# Patient Record
Sex: MALE | Race: WHITE
[De-identification: names, ages, dates, MRNs, and addresses within clinical notes are randomized per-mention and may not be internally consistent; named-entity substitution may affect disease eponyms.]

---

## 2017-11-20 ENCOUNTER — HOSPITAL ENCOUNTER (OUTPATIENT)
Dept: HOSPITAL 76 - LAB.F | Age: 67
Discharge: HOME | End: 2017-11-20
Attending: PHYSICIAN ASSISTANT
Payer: MEDICARE

## 2017-11-20 DIAGNOSIS — R00.0: ICD-10-CM

## 2017-11-20 DIAGNOSIS — D64.9: ICD-10-CM

## 2017-11-20 DIAGNOSIS — R53.83: Primary | ICD-10-CM

## 2017-11-20 DIAGNOSIS — E11.9: ICD-10-CM

## 2017-11-20 LAB
ALBUMIN DIAFP-MCNC: 4.5 G/DL (ref 3.2–5.5)
ALBUMIN/GLOB SERPL: 1.6 {RATIO} (ref 1–2.2)
ALP SERPL-CCNC: 43 IU/L (ref 42–121)
ALT SERPL W P-5'-P-CCNC: 29 IU/L (ref 10–60)
ANION GAP SERPL CALCULATED.4IONS-SCNC: 7 MMOL/L (ref 6–13)
AST SERPL W P-5'-P-CCNC: 26 IU/L (ref 10–42)
BASOPHILS NFR BLD AUTO: 0 10^3/UL (ref 0–0.1)
BASOPHILS NFR BLD AUTO: 0.6 %
BILIRUB BLD-MCNC: 0.7 MG/DL (ref 0.2–1)
BUN SERPL-MCNC: 22 MG/DL (ref 6–20)
CALCIUM UR-MCNC: 9.6 MG/DL (ref 8.5–10.3)
CHLORIDE SERPL-SCNC: 102 MMOL/L (ref 101–111)
CO2 SERPL-SCNC: 26 MMOL/L (ref 21–32)
CREAT SERPLBLD-SCNC: 0.9 MG/DL (ref 0.6–1.2)
EOSINOPHIL # BLD AUTO: 0.1 10^3/UL (ref 0–0.7)
EOSINOPHIL NFR BLD AUTO: 2.1 %
ERYTHROCYTE [DISTWIDTH] IN BLOOD BY AUTOMATED COUNT: 12.2 % (ref 12–15)
EST. AVERAGE GLUCOSE BLD GHB EST-MCNC: 108 MG/DL (ref 70–100)
FOLATE SERPL-MCNC: > 49.6 NG/ML
GFRSERPLBLD MDRD-ARVRAT: 84 ML/MIN/{1.73_M2} (ref 89–?)
GLOBULIN SER-MCNC: 2.9 G/DL (ref 2.1–4.2)
GLUCOSE SERPL-MCNC: 87 MG/DL (ref 70–100)
HB2 TOTAL: 14.1 G/DL
HBA1C BLD-MCNC: 0.5 G/DL
HEMOGLOBIN A1C %: 5.4 % (ref 4.6–6.2)
HGB UR QL STRIP: 13.5 G/DL (ref 14–18)
LYMPHOCYTES # SPEC AUTO: 1 10^3/UL (ref 1.5–3.5)
LYMPHOCYTES NFR BLD AUTO: 22.7 %
MCH RBC QN AUTO: 33.5 PG (ref 27–31)
MCHC RBC AUTO-ENTMCNC: 33.8 G/DL (ref 32–36)
MCV RBC AUTO: 99.1 FL (ref 80–94)
MONOCYTES # BLD AUTO: 0.4 10^3/UL (ref 0–1)
MONOCYTES NFR BLD AUTO: 9.3 %
NEUTROPHILS # BLD AUTO: 2.9 10^3/UL (ref 1.5–6.6)
NEUTROPHILS # SNV AUTO: 4.4 X10^3/UL (ref 4.8–10.8)
NEUTROPHILS NFR BLD AUTO: 65.3 %
PDW BLD AUTO: 7.5 FL (ref 7.4–11.4)
PLATELET # BLD: 240 10^3/UL (ref 130–450)
PROT SPEC-MCNC: 7.4 G/DL (ref 6.7–8.2)
RBC MAR: 4.02 10^6/UL (ref 4.7–6.1)
SODIUM SERPLBLD-SCNC: 135 MMOL/L (ref 135–145)
TSH SERPL-ACNC: 1.81 UIU/ML (ref 0.34–5.6)
VIT B12 SERPL-MCNC: 387 PG/ML (ref 180–914)

## 2017-11-20 PROCEDURE — 81599 UNLISTED MAAA: CPT

## 2017-11-20 PROCEDURE — 84443 ASSAY THYROID STIM HORMONE: CPT

## 2017-11-20 PROCEDURE — 82746 ASSAY OF FOLIC ACID SERUM: CPT

## 2017-11-20 PROCEDURE — 85025 COMPLETE CBC W/AUTO DIFF WBC: CPT

## 2017-11-20 PROCEDURE — 80053 COMPREHEN METABOLIC PANEL: CPT

## 2017-11-20 PROCEDURE — 85018 HEMOGLOBIN: CPT

## 2017-11-20 PROCEDURE — 83021 HEMOGLOBIN CHROMOTOGRAPHY: CPT

## 2017-11-20 PROCEDURE — 85014 HEMATOCRIT: CPT

## 2017-11-20 PROCEDURE — 82607 VITAMIN B-12: CPT

## 2017-11-20 PROCEDURE — 36415 COLL VENOUS BLD VENIPUNCTURE: CPT

## 2017-11-20 PROCEDURE — 83036 HEMOGLOBIN GLYCOSYLATED A1C: CPT

## 2018-02-09 ENCOUNTER — HOSPITAL ENCOUNTER (OUTPATIENT)
Dept: HOSPITAL 76 - LAB.F | Age: 68
Discharge: HOME | End: 2018-02-09
Attending: PHYSICIAN ASSISTANT
Payer: MEDICARE

## 2018-02-09 DIAGNOSIS — E78.5: Primary | ICD-10-CM

## 2018-02-09 LAB
CHOLEST SERPL-MCNC: 130 MG/DL
HDLC SERPL-MCNC: 46 MG/DL
HDLC SERPL: 2.8 {RATIO} (ref ?–5)
LDLC SERPL CALC-MCNC: 66 MG/DL
LDLC/HDLC SERPL: 1.4 {RATIO} (ref ?–3.6)
VLDLC SERPL-SCNC: 18 MG/DL

## 2018-02-09 PROCEDURE — 83721 ASSAY OF BLOOD LIPOPROTEIN: CPT

## 2018-02-09 PROCEDURE — 36415 COLL VENOUS BLD VENIPUNCTURE: CPT

## 2018-02-09 PROCEDURE — 80061 LIPID PANEL: CPT

## 2018-12-04 ENCOUNTER — HOSPITAL ENCOUNTER (OUTPATIENT)
Dept: HOSPITAL 76 - LAB.F | Age: 68
Discharge: HOME | End: 2018-12-04
Attending: INTERNAL MEDICINE
Payer: MEDICARE

## 2018-12-04 DIAGNOSIS — E11.9: Primary | ICD-10-CM

## 2018-12-04 LAB
ANION GAP SERPL CALCULATED.4IONS-SCNC: 7 MMOL/L (ref 6–13)
BUN SERPL-MCNC: 19 MG/DL (ref 6–20)
CALCIUM UR-MCNC: 9.5 MG/DL (ref 8.5–10.3)
CHLORIDE SERPL-SCNC: 102 MMOL/L (ref 101–111)
CHOLEST SERPL-MCNC: 147 MG/DL
CO2 SERPL-SCNC: 28 MMOL/L (ref 21–32)
CREAT SERPLBLD-SCNC: 0.8 MG/DL (ref 0.6–1.2)
EST. AVERAGE GLUCOSE BLD GHB EST-MCNC: 103 MG/DL (ref 70–100)
GFRSERPLBLD MDRD-ARVRAT: 96 ML/MIN/{1.73_M2} (ref 89–?)
GLUCOSE SERPL-MCNC: 101 MG/DL (ref 70–100)
HB2 TOTAL: 14.6 G/DL
HBA1C BLD-MCNC: 0.49 G/DL
HDLC SERPL-MCNC: 51 MG/DL
HDLC SERPL: 2.9 {RATIO} (ref ?–5)
HEMOGLOBIN A1C %: 5.2 % (ref 4.6–6.2)
LDLC SERPL CALC-MCNC: 70 MG/DL
LDLC/HDLC SERPL: 1.4 {RATIO} (ref ?–3.6)
SODIUM SERPLBLD-SCNC: 137 MMOL/L (ref 135–145)
VLDLC SERPL-SCNC: 26 MG/DL

## 2018-12-04 PROCEDURE — 83721 ASSAY OF BLOOD LIPOPROTEIN: CPT

## 2018-12-04 PROCEDURE — 80061 LIPID PANEL: CPT

## 2018-12-04 PROCEDURE — 82043 UR ALBUMIN QUANTITATIVE: CPT

## 2018-12-04 PROCEDURE — 83036 HEMOGLOBIN GLYCOSYLATED A1C: CPT

## 2018-12-04 PROCEDURE — 36415 COLL VENOUS BLD VENIPUNCTURE: CPT

## 2018-12-04 PROCEDURE — 80048 BASIC METABOLIC PNL TOTAL CA: CPT

## 2019-07-18 ENCOUNTER — HOSPITAL ENCOUNTER (OUTPATIENT)
Dept: HOSPITAL 76 - LAB.S | Age: 69
Discharge: HOME | End: 2019-07-18
Attending: INTERNAL MEDICINE
Payer: MEDICARE

## 2019-07-18 DIAGNOSIS — E78.5: Primary | ICD-10-CM

## 2019-07-18 DIAGNOSIS — E11.9: ICD-10-CM

## 2019-07-18 LAB
EST. AVERAGE GLUCOSE BLD GHB EST-MCNC: 114 MG/DL (ref 70–100)
HB2 TOTAL: 13.8 G/DL
HBA1C BLD-MCNC: 0.52 G/DL
HEMOGLOBIN A1C %: 5.6 % (ref 4.6–6.2)

## 2019-07-18 PROCEDURE — 80053 COMPREHEN METABOLIC PANEL: CPT

## 2019-07-18 PROCEDURE — 80061 LIPID PANEL: CPT

## 2019-07-18 PROCEDURE — 36415 COLL VENOUS BLD VENIPUNCTURE: CPT

## 2019-07-18 PROCEDURE — 83721 ASSAY OF BLOOD LIPOPROTEIN: CPT

## 2019-07-18 PROCEDURE — 83036 HEMOGLOBIN GLYCOSYLATED A1C: CPT

## 2020-01-02 ENCOUNTER — HOSPITAL ENCOUNTER (OUTPATIENT)
Dept: HOSPITAL 76 - LAB.S | Age: 70
Discharge: HOME | End: 2020-01-02
Attending: INTERNAL MEDICINE
Payer: MEDICARE

## 2020-01-02 DIAGNOSIS — E78.5: Primary | ICD-10-CM

## 2020-01-02 DIAGNOSIS — E11.9: ICD-10-CM

## 2020-01-02 LAB
ALBUMIN DIAFP-MCNC: 4.4 G/DL (ref 3.2–5.5)
ALBUMIN/GLOB SERPL: 1.6 {RATIO} (ref 1–2.2)
ALP SERPL-CCNC: 44 IU/L (ref 42–121)
ALT SERPL W P-5'-P-CCNC: 30 IU/L (ref 10–60)
ANION GAP SERPL CALCULATED.4IONS-SCNC: 6 MMOL/L (ref 6–13)
AST SERPL W P-5'-P-CCNC: 26 IU/L (ref 10–42)
BILIRUB BLD-MCNC: 0.9 MG/DL (ref 0.2–1)
BUN SERPL-MCNC: 18 MG/DL (ref 6–20)
CALCIUM UR-MCNC: 9.2 MG/DL (ref 8.5–10.3)
CHLORIDE SERPL-SCNC: 103 MMOL/L (ref 101–111)
CHOLEST SERPL-MCNC: 152 MG/DL
CO2 SERPL-SCNC: 28 MMOL/L (ref 21–32)
CREAT SERPLBLD-SCNC: 0.9 MG/DL (ref 0.6–1.2)
EST. AVERAGE GLUCOSE BLD GHB EST-MCNC: 126 MG/DL (ref 70–100)
GFRSERPLBLD MDRD-ARVRAT: 83 ML/MIN/{1.73_M2} (ref 89–?)
GLOBULIN SER-MCNC: 2.8 G/DL (ref 2.1–4.2)
GLUCOSE SERPL-MCNC: 106 MG/DL (ref 70–100)
HB2 TOTAL: 13.9 G/DL
HBA1C BLD-MCNC: 0.59 G/DL
HDLC SERPL-MCNC: 52 MG/DL
HDLC SERPL: 2.9 {RATIO} (ref ?–5)
HEMOGLOBIN A1C %: 6 % (ref 4.6–6.2)
LDLC SERPL CALC-MCNC: 73 MG/DL
LDLC/HDLC SERPL: 1.4 {RATIO} (ref ?–3.6)
PROT SPEC-MCNC: 7.2 G/DL (ref 6.7–8.2)
SODIUM SERPLBLD-SCNC: 137 MMOL/L (ref 135–145)
VLDLC SERPL-SCNC: 27 MG/DL

## 2020-01-02 PROCEDURE — 80053 COMPREHEN METABOLIC PANEL: CPT

## 2020-01-02 PROCEDURE — 83721 ASSAY OF BLOOD LIPOPROTEIN: CPT

## 2020-01-02 PROCEDURE — 36415 COLL VENOUS BLD VENIPUNCTURE: CPT

## 2020-01-02 PROCEDURE — 80061 LIPID PANEL: CPT

## 2020-01-02 PROCEDURE — 83036 HEMOGLOBIN GLYCOSYLATED A1C: CPT

## 2021-04-14 ENCOUNTER — HOSPITAL ENCOUNTER (OUTPATIENT)
Dept: HOSPITAL 76 - LAB.S | Age: 71
Discharge: HOME | End: 2021-04-14
Attending: NURSE PRACTITIONER
Payer: MEDICARE

## 2021-04-14 DIAGNOSIS — Z01.812: Primary | ICD-10-CM

## 2021-04-14 LAB
ALBUMIN DIAFP-MCNC: 4.2 G/DL (ref 3.2–5.5)
ALBUMIN/GLOB SERPL: 1.7 {RATIO} (ref 1–2.2)
ALP SERPL-CCNC: 43 IU/L (ref 42–121)
ALT SERPL W P-5'-P-CCNC: 34 IU/L (ref 10–60)
ANION GAP SERPL CALCULATED.4IONS-SCNC: 7 MMOL/L (ref 6–13)
AST SERPL W P-5'-P-CCNC: 35 IU/L (ref 10–42)
BILIRUB BLD-MCNC: 0.6 MG/DL (ref 0.2–1)
BUN SERPL-MCNC: 17 MG/DL (ref 6–20)
CALCIUM UR-MCNC: 9.5 MG/DL (ref 8.5–10.3)
CHLORIDE SERPL-SCNC: 102 MMOL/L (ref 101–111)
CO2 SERPL-SCNC: 27 MMOL/L (ref 21–32)
CREAT SERPLBLD-SCNC: 0.8 MG/DL (ref 0.6–1.2)
GFRSERPLBLD MDRD-ARVRAT: 95 ML/MIN/{1.73_M2} (ref 89–?)
GLOBULIN SER-MCNC: 2.5 G/DL (ref 2.1–4.2)
GLUCOSE SERPL-MCNC: 114 MG/DL (ref 70–100)
POTASSIUM SERPL-SCNC: 3.6 MMOL/L (ref 3.5–5)
PROT SPEC-MCNC: 6.7 G/DL (ref 6.7–8.2)
SODIUM SERPLBLD-SCNC: 136 MMOL/L (ref 135–145)

## 2021-04-14 PROCEDURE — 36415 COLL VENOUS BLD VENIPUNCTURE: CPT

## 2021-04-14 PROCEDURE — 80053 COMPREHEN METABOLIC PANEL: CPT

## 2021-04-15 ENCOUNTER — HOSPITAL ENCOUNTER (OUTPATIENT)
Dept: HOSPITAL 76 - COV | Age: 71
Discharge: HOME | End: 2021-04-15
Attending: OPHTHALMOLOGY
Payer: MEDICARE

## 2021-04-15 DIAGNOSIS — Z01.812: Primary | ICD-10-CM

## 2021-04-15 DIAGNOSIS — Z20.822: ICD-10-CM

## 2021-04-15 DIAGNOSIS — H35.379: ICD-10-CM

## 2022-06-14 ENCOUNTER — HOSPITAL ENCOUNTER (OUTPATIENT)
Dept: HOSPITAL 76 - SDS | Age: 72
Discharge: HOME | End: 2022-06-14
Attending: SURGERY
Payer: MEDICARE

## 2022-06-14 VITALS — DIASTOLIC BLOOD PRESSURE: 63 MMHG | SYSTOLIC BLOOD PRESSURE: 103 MMHG

## 2022-06-14 DIAGNOSIS — D12.5: ICD-10-CM

## 2022-06-14 DIAGNOSIS — D12.2: ICD-10-CM

## 2022-06-14 DIAGNOSIS — E11.9: ICD-10-CM

## 2022-06-14 DIAGNOSIS — E66.9: ICD-10-CM

## 2022-06-14 DIAGNOSIS — K64.8: ICD-10-CM

## 2022-06-14 DIAGNOSIS — D12.3: ICD-10-CM

## 2022-06-14 DIAGNOSIS — K57.30: ICD-10-CM

## 2022-06-14 DIAGNOSIS — D12.0: ICD-10-CM

## 2022-06-14 DIAGNOSIS — G47.33: ICD-10-CM

## 2022-06-14 DIAGNOSIS — Z12.11: Primary | ICD-10-CM

## 2022-06-14 DIAGNOSIS — Z79.84: ICD-10-CM

## 2022-06-14 PROCEDURE — 0DBK8ZZ EXCISION OF ASCENDING COLON, VIA NATURAL OR ARTIFICIAL OPENING ENDOSCOPIC: ICD-10-PCS | Performed by: SURGERY

## 2022-06-14 PROCEDURE — 0DBN8ZZ EXCISION OF SIGMOID COLON, VIA NATURAL OR ARTIFICIAL OPENING ENDOSCOPIC: ICD-10-PCS | Performed by: SURGERY

## 2022-06-14 PROCEDURE — 0DBL8ZZ EXCISION OF TRANSVERSE COLON, VIA NATURAL OR ARTIFICIAL OPENING ENDOSCOPIC: ICD-10-PCS | Performed by: SURGERY

## 2022-06-14 PROCEDURE — 45380 COLONOSCOPY AND BIOPSY: CPT

## 2022-06-14 PROCEDURE — 0DBH8ZZ EXCISION OF CECUM, VIA NATURAL OR ARTIFICIAL OPENING ENDOSCOPIC: ICD-10-PCS | Performed by: SURGERY

## 2022-06-14 NOTE — ANESTHESIA
Pre-Anesthesia VS, & Labs





- Diagnosis





history of colon polyps





- Procedure





colonoscopy


Vital Signs: 





                                        











Temp Pulse Resp BP Pulse Ox


 


 36.4 C L  66   16   139/84 H  95 


 


 22 13:05  22 13:05  22 13:05  22 13:05  22 13:05











Height: 5 ft 9 in


Weight (kg): 93 kg


Body Mass Index: 30.2


BMI Classification: Obese





- NPO


>8 hours





- Lab Results


Current Lab Results: 





Laboratory Tests





22 13:11: POC Whole Bld Glucose 101 H








Lab results reviewed: Yes





Home Medications and Allergies


Home Medications: 


Ambulatory Orders





Atorvastatin [Lipitor] 10 mg PO DAILY 22 


Chlorthalidone 25 mg PO DAILY 22 


Losartan Potassium [Cozaar] 100 mg PO DAILY 22 


Metoprolol Succinate [Toprol Xl] 50 mg PO DAILY 22 


Omeprazole 20 mg PO DAILY 22 


Pramipexole Di-HCl [Mirapex] 2 mg PO DAILY 22 


Tamsulosin [Flomax] 0.4 mg PO DAILY 22 


metFORMIN [Glucophage] 500 mg PO BIDWM 22 











                                        





Atorvastatin [Lipitor] 10 mg PO DAILY 22 


Chlorthalidone 25 mg PO DAILY 22 


Losartan Potassium [Cozaar] 100 mg PO DAILY 22 


Metoprolol Succinate [Toprol Xl] 50 mg PO DAILY 22 


Omeprazole 20 mg PO DAILY 22 


Pramipexole Di-HCl [Mirapex] 2 mg PO DAILY 22 


Tamsulosin [Flomax] 0.4 mg PO DAILY 22 


metFORMIN [Glucophage] 500 mg PO BIDWM 22 








Allergies/Adverse Reactions: 


                                    Allergies











Allergy/AdvReac Type Severity Reaction Status Date / Time


 


acetaminophen AdvReac  Unknown Verified 22 13:09





[From Darvocet-N]     


 


propoxyphene AdvReac  Unknown Verified 22 13:09





[From Darvocet-N]     














Anes History & Medical History





- Anesthetic History


Anesthesia Complications: reports: No previous complications





- Medical History


Cardiovascular: reports: Hypertension, High cholesterol


Pulmonary: reports: Sleep apnea


Gastrointestinal: reports: Colon polyps


Urinary: reports: Benign prostate hypertrophy


Neuro: reports: None


Musculoskeletal: reports: None


Endocrine/Autoimmune: reports: Type 2 diabetes


Blood Disorders: reports: None


Skin: reports: None


Smoking Status: Never smoker


Psychosocial: reports: Alcohol (4 drinks per night)





- Surgical History


General: reports: Liver surgery, Other


Orthopedic: reports: Arthroscopic surgery





Exam


General: Alert, Oriented x3, No acute distress


Dental: WNL


Mouth Openin Fingerbreadth


Neck Mobility: Normal


Mallampati classification: I


Thyromental Distance: 4-6 cm


Mental/Cognitive Status: Alert/Oriented X3, Normal for patient





Plan


Anesthesia Type: General, Total IV


Consent for Procedure(s) Verified and Reviewed: Yes


Code Status: Attempt Resuscitation


ASA classification: 2-Mild systemic disease


Is this case an emergency?: No

## 2022-06-14 NOTE — ANESTHESIA POST OP EVALUATION
Anesthesia Post Eval





- Post Anesthesia Eval


Vitals: 





                                Last Vital Signs











Temp  37 C   06/14/22 14:24


 


Pulse  65   06/14/22 14:41


 


Resp  19   06/14/22 14:41


 


BP  103/63   06/14/22 14:41


 


Pulse Ox  97   06/14/22 14:41











CV Function Including HR & BP: Stable


Pain Control: Satisfactory


Nausea & Vomiting: Negative


Mental Status: Baseline


Respiratory Status: Airway Patent


Hydration Status: Satisfactory


Anesthesia Complications: None

## 2022-08-31 ENCOUNTER — HOSPITAL ENCOUNTER (OUTPATIENT)
Dept: HOSPITAL 76 - SC | Age: 72
Discharge: HOME | End: 2022-08-31
Attending: NURSE PRACTITIONER
Payer: MEDICARE

## 2022-08-31 VITALS — DIASTOLIC BLOOD PRESSURE: 75 MMHG | SYSTOLIC BLOOD PRESSURE: 116 MMHG

## 2022-08-31 DIAGNOSIS — G47.33: Primary | ICD-10-CM

## 2022-08-31 DIAGNOSIS — E66.9: ICD-10-CM

## 2022-08-31 PROCEDURE — 99212 OFFICE O/P EST SF 10 MIN: CPT

## 2022-08-31 PROCEDURE — 99203 OFFICE O/P NEW LOW 30 MIN: CPT

## 2022-08-31 NOTE — SLEEP CARE CONSULTATION
Information from patient questionnaire entered by Alon Caban MA.





I have reviewed and concur with the information entered by Alon Caban MA. 

This document represents the service I personally performed and the decisions 

made by me, Alis Licona ARNP.





History of Present Illness


Service Date and Time: 08/31/2022    1320


Reason for Visit: New patient (LAST SEEN 11/2014, PRIOR SS 10/25/2014 [facility], NON 

CPAP USER, ), Previously diagnosed sleep apnea


Accompanied by: Spouse


Chief Complaint: reports: Excessive daytime sleepiness


Date of Onset: 1 YEAR


Usual bedtime: Lay down at 10 PM; watch TV; fall asleep about midnight


Time it takes to fall asleep: A LONG TIME; 20-30 minutes


Snores at night: Yes


Observed to quit breathing while asleep: Yes


Sleeps alone due to snoring: No


Number of times waking at night: 2-3


Reasons for waking at night: reports: Bathroom.  denies: Choking, Snoring, 

Gasping for air


Toss, Turn, or Twitch while sleeping: Yes


Recalls having dreams: Yes


Usually gets out of bed at: 0530


Feels refreshed in the morning: No


Morning headache: No


Sleepy or fatigued during the day: Yes (taking unintentional naps; daily, more 

than once a day)


Ever fallen asleep while driving: Yes (drowsy driving; no accidents)


Takes day naps: No


Dreams during day naps: No


Prior sleep studies: Yes


Year and Where: 2014 Oct [facility]


Type of Sleep Study: Polysomnography


Additional HPI information: 





I had the pleasure of seeing MARISELA DUMONT today. He was last seen in this 

office in 2014 and he was previously diagnosed with moderate obstructive sleep 

apnea with an average AHI of 15.0. He is not currently using a CPAP device.  His

current complaint is excessive daytime sleepiness. He does snore loudly and has 

had pauses in breathing in the past. His daytime sleepiness has been worse over 

the last year. He states that most of the time he wakes feeling rested in the 

morning. 








- Parasomnia Symptoms


Ever been unable to move upon waking from sleep: No


Walks in sleep: No


Talks in sleep: No


Ever acted out dreams in sleep: No


Ever felt weak in the knees when startled or emotional: No


Bothered by creepy, crawly, restless sensations in legs: Yes (RLS; treated with 

Mirapex)


Problems with memory or concentration: No





Subjective


Initial [place] Sleepiness Scale score: 13 (8/31/2022)





Past Medical History


Past Medical History: reports: Hypertension, GERD, Other (RLS; high cholesterol)





Social History


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 Patient is  and lives in [place]. 





Have you smoked in the past 12 months: No


Alcohol use: Yes


Alcohol amount and frequency: 3 X DAILY


Caffeine use: Yes


Caffeine amount and frequency: 3 X DAILY





Family History


Family history of sleep disordered breathing: No





Allergies and Home Medications


Known drug allergies: Yes


Drug allergies reviewed: Yes (acetaminphen propoxyphene - old, GI UPSET)


Home medication list reviewed: Yes


Allergy and home medication list: 


Allergies





acetaminophen [From Darvocet-N] Adverse Reaction (Verified 06/14/22 13:09)


   Unknown


propoxyphene [From Darvocet-N] Adverse Reaction (Verified 06/14/22 13:09)


   Unknown





VERIFIED BY TEREZA Mackay 8/31/2022, 1340





Medications:


Atorvastatin 10 mg


Zocor 20 mg


Mirapex 


Metoprolol 25 mg


Cozaar 100 mg


Glacophage 500 mg


Prilosec 20 mg


Chlorthalidone 1/2 tab 25 mg


Tamsulosin 0.4 mg





Review of Systems


Weight gain over past 5 years: 5


Cardiovascular: reports: high blood pressure


Neurological: denies: headaches


Psychiatric: denies: anxiety, depression


Ear/Nose/Throat: reports: wisdom teeth removed.  denies: tonsillectomy


Endocrine: reports: increased urination.  denies: thyroid disease


Immunologic: reports: sneezing





Physical Exam


Vital signs obtained and entered by: TEREZA MACKAY


Blood Pressure: 116/75 (R20, P56, RIGHT)


Heart Rate: 56


O2 Saturation: 96 (CLOTH MASK)


Height: 5 ft 9 in


Weight: 220 lb (CLOTHES)


Body Mass Index: 32.5


BMI Classification: Obese


Neck circumference: 15 (INCHES)


Mouth and throat: narrow oropharynx


Soft palate: long


Hard palate: normal


Uvula: normal


Uvula visualization: 50% Mallampati Class II


Tongue: normal in size


Tonsils: small


Neck: normal w/o lymphadenopathy or thyromegaly


Heart: regular rate and rhythm


Lungs: clear bilaterally





Impression and Plan





1. Suspected Obstructive Sleep Apnea-Hypopnea Syndrome, as previously diagnosed 

and as still suggested by a history of loud and irregular snoring, observed 

cessation of breath while asleep, unrefreshed sleep, and excessive daytime 

sleepiness. I recommend proceeding to polysomnography to confirm the diagnosis 

and to assess severity. If the patient has significant sleep disordered 

breathing, a manual CPAP titration study will also be performed to find the 

optimal treatment pressure. I informed the patient of what the sleep studies 

involve and after some discussion, obtained agreement to proceed. The 

pathophysiology of obstructive sleep apnea-hypopnea syndrome was discussed with 

the patient and health risks of cardiovascular and cerebrovascular disease if 

not treated.  Risks of drowsy driving discussed in detail and patient advised to

avoid long distance driving and to pull over at the first sign of drowsiness. 

Patient agreed to plan. 





* Schedule polysomnography 


* Avoid long distance driving or driving when feeling sleepy.


* Avoid alcohol, sedative and muscle relaxant around bedtime.


* Attempt to lose weight.


* Review instructions provided by trained office staff on how to prepare for the

  sleep study.


* Return for follow-up after sleep study completed.








Counseling Topics: Weight loss health impact


Visit Type: In Office


Other Participants: Spouse/Significant Other


Time Spent with Patient (minutes): 34


Provider Statement: I spent 100% of the Face to Face Visit with the patient with

greater than 50% spent counseling the patient and coordination of care.

## 2022-09-13 ENCOUNTER — HOSPITAL ENCOUNTER (OUTPATIENT)
Dept: HOSPITAL 76 - SC | Age: 72
Discharge: HOME | End: 2022-09-13
Attending: NURSE PRACTITIONER
Payer: MEDICARE

## 2022-09-13 DIAGNOSIS — G47.33: Primary | ICD-10-CM

## 2022-09-13 PROCEDURE — 95810 POLYSOM 6/> YRS 4/> PARAM: CPT

## 2022-09-28 ENCOUNTER — HOSPITAL ENCOUNTER (OUTPATIENT)
Dept: HOSPITAL 76 - SC | Age: 72
Discharge: HOME | End: 2022-09-28
Attending: NURSE PRACTITIONER
Payer: MEDICARE

## 2022-09-28 VITALS — SYSTOLIC BLOOD PRESSURE: 120 MMHG | DIASTOLIC BLOOD PRESSURE: 64 MMHG

## 2022-09-28 DIAGNOSIS — G47.33: Primary | ICD-10-CM

## 2022-09-28 DIAGNOSIS — R09.02: ICD-10-CM

## 2022-09-28 DIAGNOSIS — E66.9: ICD-10-CM

## 2022-09-28 PROCEDURE — 99213 OFFICE O/P EST LOW 20 MIN: CPT

## 2022-09-28 PROCEDURE — 99212 OFFICE O/P EST SF 10 MIN: CPT

## 2022-09-28 NOTE — SLEEP CARE CONSULTATION
Information from patient questionnaire entered by James Newell.





I have reviewed and concur with the information entered by James Newell. This 

document represents the service I personally performed and the decisions made by

me, Alis Licona ARNP.





History of Present Illness


Service Date and Time: 09/28/2022    1242


Accompanied by: Spouse


Initial [place] Sleepiness Scale score: 13 (8/31/2022)


Current [place] Sleepiness Scale score: 5 (9/28/22)


Additional HPI information: 





MARISELA DUMONT returns for follow up and results of the recently performed 

polysomnography.





I explained the pathophysiology behind obstructive sleep apnea. We then spent 

quite a bit of time discussing different treatment options.  For mild 

obstructive sleep apnea, surgery and oral appliance are alternatives to nasal 

CPAP therapy but in moderate or severe cases, nasal CPAP is the most effective 

and reliable treatment.   





Because apnea is primarily in supine position, then positional management 

therapy could be effective. Methods discussed such as positioning with pillows 

to prevent supine sleep. I reviewed the impact of weight changes on sleep apnea 

and strongly recommended losing weight. After some discussion, the patient opted

to go with the nasal CPAP therapy.  Nasal autoCPAP set at 4-15 cmH20 will be 

ordered with rationale explained. A manual titration study will be ordered if 

unable to find optimal pressure with office adjustments. 





I explained how CPAP machine works and what to expect when using the machine.  

Using CPAP every night in order to get used to it was emphasized.  Patient 

advised to put CPAP mask on before getting into bed so as not to fall asleep 

without CPAP.  To assist acclimation to CPAP use, it could also be used for a 

short time during day while reading or watching TV. The patient was instructed 

to call the CPAP supplier to discuss any mechanical problem that may occur. If 

the mask given is uncomfortable or is difficult to keep on through the night 

even with adjustment, contact the CPAP supplier as many will replace with 

another mask style if notified before  30 days.  If snoring or perceives is not 

getting enough air or too much air from the machine, notify this office. Patient

counseled not drink alcohol less than 4 hours before bedtime as it can increase 

snoring and apnea.  Patient was cautioned about risks of drowsy driving until 

sleepiness symptoms resolve. Patient denies drowsy driving. 





Sleep Study





- Results


Type of Sleep Study: Polysomnography (DONE ON 9/13/22)


Prior sleep studies: Yes


Year and Where: 2014 Oct [facility]


Polysomnography/Home Sleep Study results: 





IMPRESSION: The quality of the study is good. The patient had slightly reduced 

sleep efficiency due to frequent


awakenings throughout the night. The sleep architecture was abnormal for sleep 

fragmentation and reduced amount


of time spent in REM and slow wave sleep (N3). Respiratory monitoring showed 

moderate obstructive sleep apneahypopnea (AHI = 29.5) associated with frequent 

arousals, oxyhemoglobin desaturation and mild hypoxia (domingo


oxygen saturation of 81%). The respiratory events occurred independently of 

sleep stage and body position (supine


AHI = 25.8; non-supine = 44.53). Snore was infrequent and light in intensity. 

There was no significant periodic leg


movement of sleep. Cardiac rhythm was normal sinus rhythm without significant 

arrhythmia. No abnormal


behavior (parasomnia) observed during the night.





Allergies and Home Medications


Home medication list reviewed: Yes (no changes)


Allergy and home medication list: 


Allergies





acetaminophen [From Darvocet-N] Adverse Reaction (Verified 06/14/22 13:09)


   Unknown


propoxyphene [From Darvocet-N] Adverse Reaction (Verified 06/14/22 13:09)


   Unknown





Review of Systems


Review of systems same as previous: Yes (no changes)





Physical Exam


Vital signs obtained and entered by: NANCY CHRISTINE


Blood Pressure: 120/64 (left arm )


Cuff size: regular


Heart Rate: 60


O2 Saturation: 94


Height: 5 ft 9 in


Weight: 213 lb


Body Mass Index: 31.4


BMI Classification: Obese





Impression and Plan





1. Obstructive Sleep Apnea-Hypopnea Syndrome, moderate, with lowest oxygen 

saturation of 81%. Obviously this is the cause of the patients symptoms of 

unrefreshed sleep, and excessive daytime sleepiness. Positive pressure therapy 

could benefit hypertension, gastric reflux and RLS. As mentioned above, the 

patient will be started on nasal autoCPAP therapy with pressure set at 4-15 cmH2

O. Compliance guidelines also reviewed. A copy of compliance guidelines will be 

given for reference at check out.  





2. Hypoxemia, mild, with a domingo oxygen saturation of 81% and 15.7 minutes spent

under 90%. His baseline oxygen saturation was normal with an average oxygen 

saturation of 92%.





* Nasal auto CPAP therapy, pressure at 4-15 cm H2O.


* Attempt to lose weight.


* Avoid alcohol consumption near bedtime.


* Avoid supine sleep until using CPAP.


* The patient is again cautioned about driving until sleepiness completely 

  resolves.


* Return one month after CPAP obtained. I will assess response to therapy and 

  compliance at that time.


c


Counseling Topics: Weight loss health impact


Visit Type: In Office


Other Participants: Spouse/Significant Other


Time Spent with Patient (minutes): 21


Provider Statement: I spent 100% of the Face to Face Visit with the patient with

greater than 50% spent counseling the patient and coordination of care.

## 2022-12-06 ENCOUNTER — HOSPITAL ENCOUNTER (OUTPATIENT)
Dept: HOSPITAL 76 - SC | Age: 72
Discharge: HOME | End: 2022-12-06
Attending: NURSE PRACTITIONER
Payer: MEDICARE

## 2022-12-06 VITALS — SYSTOLIC BLOOD PRESSURE: 140 MMHG | DIASTOLIC BLOOD PRESSURE: 80 MMHG

## 2022-12-06 DIAGNOSIS — E66.9: ICD-10-CM

## 2022-12-06 DIAGNOSIS — G47.33: Primary | ICD-10-CM

## 2022-12-06 PROCEDURE — 99213 OFFICE O/P EST LOW 20 MIN: CPT

## 2022-12-06 PROCEDURE — 99212 OFFICE O/P EST SF 10 MIN: CPT

## 2022-12-06 NOTE — SLEEP CARE CONSULTATION
Information from patient questionnaire entered by Peri Harry.





I have reviewed and concur with the information entered by Peri Harry. This 

document represents the service I personally performed and the decisions made by

me, Alis Licona ARNP.





History of Present Illness


Service Date and Time: 12/06/2022    1103


Previous diagnosis: Moderate, Obstructive Sleep Apnea-Hypopnea Syndrome


AHI: 29.5 (in 2022)


Reason for follow up: first compliance (SET UP 10/07/22)


Accompanied by: Spouse


Equipment type: CPAP (RESMED)


Equipment obtained from: Other ([facility]; getting supplies)


Mask style: Nasal pillows (Manjeet II)


Backup mask available: No (will keep old mask when replaced)


Last cushion change: 4 days ago


Prior sleep studies: Yes


Year and Where: 2014 Oct [facility]


Type of Sleep Study: Polysomnography (DONE ON 9/13/22)


HPI additional information: 





MARISELA DUMONT was diagnosed to have moderate, AHI 29.5, obstructive sleep apnea-

hypopnea syndrome and returned today for CPAP therapy first compliance follow-

up.





Sleep Study





- Results


Type of Sleep Study: Polysomnography (DONE ON 9/13/22)


Prior sleep studies: Yes


Year and Where: 2014 Oct [facility]





CPAP Compliance Data





- Data Reviewed with Patient


Average duration of nightly device use: 4 hours 4 minutes


Compliance rate %: 50 (27/30 days used)


Current pressure setting (cmH2O): 4-15 (median 7.6, avg 11.2, max 12.1)


Average residual AHI: 7.3


Central apnea: 0.7


Obstructive apnea: 3.4





Subjective


Patient concerns: reports: nasal congestion, dry mouth, nose, throat.  denies: 

aerophagia, mask discomfort, air blowing in eyes, mask leak noise, condensation 

in mask/hose, epistaxis


Observed to snore while using device: No


Current pressure setting perceived as: comfortable


On therapy, patient: reports: other (he hasn't noticed a difference).  denies: 

drowsiness while driving


Initial [place] Sleepiness Scale score: 13 (8/31/2022)


Current [place] Sleepiness Scale score: 11 (12/06/2022)





Allergies and Home Medications


Drug allergies reviewed: Yes (as listed in EMR)


Home medication list reviewed: Yes (no changes)





Review of Systems


Review of systems same as previous: Yes (no changes)





Physical Exam


Vital signs obtained and entered by: PERI DAWSON MA


Blood Pressure: 140/80 (LEFT ARM)


Cuff size: regular


Heart Rate: 65


O2 Saturation: 95


Height: 5 ft 9 in


Weight: 224 lb 6.4 oz


Body Mass Index: 33.1


BMI Classification: Obese





Impression and Plan





1. Obstructive Sleep Apnea-Hypopnea Syndrome, moderate, with fair treatment 

compliance and fair apnea control with elevated residual AHI. Patient's [place] 

was 13/24 initially and is 11/24 today. Patient states he has been having issues

with allergies.  He will wake up after 4 or more hours in the mask and his nose 

will be plugged up.  He will take the mask off at that time because he cannot 

breathe through his nose. Nasal congestion can be reduced with increasing the 

CPAP humidity. The heated hose can be  adjusted higher if condensation with 

higher humidity setting. The patients pressure will be changed to autoCPAP 9-12

cmH20 for elevation of residual AHI. Patient advised to contact me if pressure 

change is uncomfortable so that it can be adjusted. Goals for apnea control 

discussed. Patient's apnea severity and rationale for treatment to reduce apnea,

improve sleep quality and reduce cardiovascular and cerebrovascular events was 

reviewed. I also reviewed the benefit of consistent device use of CPAP for 

hypertension, gastric reflux and RLS.





2. Obesity, unspecified. Currently patients BMI is 33.1.  Obesity increases the

risk of apnea, CPAP pressure requirements and overall health risks especially 

cardiovascular and diabetes. Thus patient is advised to lose weight. 








* Change auto CPAP pressure to 9-12 cmH2O


* Notify me if snoring with mask or feeling that the pressure is too much or too

  little


* Attempt to lose weight


* Call this office if any problems using CPAP


* Return for follow up in 1-2 months, or sooner if concerns arise





Counseling Topics: Spare mask, Weight loss health impact


Visit Type: In Office


Other Participants: Spouse/Significant Other


Time Spent with Patient (minutes): 22


Provider Statement: I spent 100% of the Face to Face Visit with the patient with

greater than 50% spent counseling the patient and coordination of care.

## 2023-04-18 ENCOUNTER — HOSPITAL ENCOUNTER (OUTPATIENT)
Dept: HOSPITAL 76 - SC | Age: 73
Discharge: HOME | End: 2023-04-18
Attending: NURSE PRACTITIONER
Payer: MEDICARE

## 2023-04-18 VITALS — DIASTOLIC BLOOD PRESSURE: 64 MMHG | SYSTOLIC BLOOD PRESSURE: 114 MMHG

## 2023-04-18 DIAGNOSIS — G47.33: Primary | ICD-10-CM

## 2023-04-18 DIAGNOSIS — E66.9: ICD-10-CM

## 2023-04-18 PROCEDURE — 99213 OFFICE O/P EST LOW 20 MIN: CPT

## 2023-04-18 PROCEDURE — 99212 OFFICE O/P EST SF 10 MIN: CPT

## 2023-04-18 NOTE — SLEEP CARE CONSULTATION
Information from patient questionnaire entered by Peri Harry.





I have reviewed and concur with the information entered by Peri Harry. This 

document represents the service I personally performed and the decisions made by

me, Alis Licona ARNP.





History of Present Illness


Service Date and Time: 04/18/2023    1141


Previous diagnosis: Moderate, Obstructive Sleep Apnea-Hypopnea Syndrome


AHI: 29.5 (in 2022)


Reason for follow up: three month (F/U)


Accompanied by: Spouse


Equipment type: CPAP (RESMED Airsense 11, s/u 10/07/2022)


Equipment obtained from: Other ([facility]; getting supplies)


Mask style: Nasal pillows (Manjeet II)


Backup mask available: Yes (old mask)


Last cushion change: 2 weeks


Prior sleep studies: Yes


Year and Where: 2014 Oct [facility]


Type of Sleep Study: Polysomnography (DONE ON 9/13/22)


HPI additional information: 





MARISELA DUMONT was diagnosed to have moderate, AHI 29.5, obstructive sleep apnea-

hypopnea syndrome and returned today with spouse for CPAP therapy three month 

follow-up.





Sleep Study





- Results


Type of Sleep Study: Polysomnography (DONE ON 9/13/22)


Prior sleep studies: Yes


Year and Where: 2014 Oct [facility]





CPAP Compliance Data





- Data Reviewed with Patient


Average duration of nightly device use: 3 hours 23 minutes


Compliance rate %: 39 (85/90 days used)


Current pressure setting (cmH2O): 10-13 (avg 12.3, max 12.5)


Average residual AHI: 8.5


Central apnea: 0.5


Obstructive apnea: 4.0


Hypopnea: 2.5


Average large leak: 12.5 lpm





Subjective


Missed days of use due to: reports: illness (sinus issues), other (tooth 

implant)


Patient concerns: reports: mask discomfort, mask leak noise, dry mouth, nose, 

throat (dry mouth, occasional with oral venting).  denies: aerophagia, air 

blowing in eyes, condensation in mask/hose, nasal congestion, epistaxis


Observed to snore while using device: No


Current pressure setting perceived as: too high (at times)


On therapy, patient: reports: other (does not feel improvement at this time).  

denies: sleeping better, awakening more refreshed, being more awake and alert 

during the day, more rested overall, drowsiness while driving


Initial [place] Sleepiness Scale score: 13 (8/31/2022)


Current [place] Sleepiness Scale score: 9 (04/18/23)





Allergies and Home Medications


Known drug allergies: Yes (as listed)


Allergy and home medication list: 


Allergies





acetaminophen [From Darvocet-N] Adverse Reaction (Verified 04/17/23 09:09)


   Unknown


propoxyphene [From Darvocet-N] Adverse Reaction (Verified 04/17/23 09:09)


   Unknown





Physical Exam


Vital signs obtained and entered by: PERI DAWSON MA


Blood Pressure: 114/64 (LEFT ARM)


Cuff size: regular


Heart Rate: 64


O2 Saturation: 94


Height: 5 ft 9 in


Weight: 225 lb 9.6 oz


Body Mass Index: 33.3


BMI Classification: Obese





Impression and Plan





1. Obstructive Sleep Apnea-Hypopnea Syndrome, moderate, with fair treatment 

compliance and fair apnea control. Patient does not feel more rested or like he 

is sleeping better with the CPAP. He average about 3.5 hours of sleep with the 

CPAP. I advised him that he needs more consistent sleep with CPAP mask on over 4

hours, ideally more like 6 hours to get better rest. He voiced understanding. 

The patients pressure will be changed to autoCPAP 11-13.4 cmH20 for elevation 

of residual AHI. Patient advised to contact me if pressure change is 

uncomfortable so that it can be adjusted. Goals for apnea control discussed. If 

we are still unable to get good results from this range of pressures I will 

consider ordering a titration study.  Patient voiced understanding and 

agreement. Patient's apnea severity and rationale for treatment to reduce apnea,

improve sleep quality and reduce cardiovascular and cerebrovascular events was 

reviewed. I also reviewed the benefit of consistent device use of CPAP for 

hypertension, gastric reflux and RLS.





2. Obesity, unspecified. Currently patients BMI is 33.3.  Obesity increases the

risk of apnea, CPAP pressure requirements and overall health risks especially 

cardiovascular and diabetes. Thus patient is advised to lose weight.  The 

patient's CPAP pressure range should accommodate some weight loss.





* Change auto CPAP pressure to 11-13.4 cmH2O


* Notify me if snoring with mask or feeling that the pressure is too much or too

  little


* Attempt to lose weight


* Call this office if any problems using CPAP


* Return for follow up in 3 months, or sooner if concerns arise





Counseling Topics: Spare mask, Weight loss health impact


Visit Type: In Office


Time Spent with Patient (minutes): 21


Provider Statement: I spent 100% of the Face to Face Visit with the patient with

greater than 50% spent counseling the patient and coordination of care.

## 2023-07-11 ENCOUNTER — HOSPITAL ENCOUNTER (OUTPATIENT)
Dept: HOSPITAL 76 - SC | Age: 73
Discharge: HOME | End: 2023-07-11
Attending: NURSE PRACTITIONER
Payer: MEDICARE

## 2023-07-11 VITALS — SYSTOLIC BLOOD PRESSURE: 128 MMHG | DIASTOLIC BLOOD PRESSURE: 70 MMHG

## 2023-07-11 DIAGNOSIS — E66.9: ICD-10-CM

## 2023-07-11 DIAGNOSIS — G47.33: Primary | ICD-10-CM

## 2023-07-11 PROCEDURE — 99213 OFFICE O/P EST LOW 20 MIN: CPT

## 2023-07-11 PROCEDURE — 99212 OFFICE O/P EST SF 10 MIN: CPT

## 2023-07-11 NOTE — SLEEP CARE CONSULTATION
Information from patient questionnaire entered by Peri Harry.





I have reviewed and concur with the information entered by Peri Harry. This 

document represents the service I personally performed and the decisions made by

me, Alis Licona ARNP.





History of Present Illness


Service Date and Time: 07/11/2023    1115


Previous diagnosis: Moderate, Obstructive Sleep Apnea-Hypopnea Syndrome


AHI: 29.5 (in 2022)


Reason for follow up: three month (F/U PRESSURE CHANGE )


Accompanied by: Spouse


Equipment type: CPAP (RESMED Airsense 11, s/u 10/07/2022)


Equipment obtained from: Other ([facility]; getting supplies)


Mask style: Nasal pillows (Manjeet II)


Backup mask available: Yes (old mask)


Last cushion change: 2 weeks


Prior sleep studies: Yes


Year and Where: 2014 Oct [facility]


Type of Sleep Study: Polysomnography (DONE ON 9/13/22)


HPI additional information: 





MARISELA DUMONT was diagnosed to have moderate, AHI 29.5, obstructive sleep apnea-

hypopnea syndrome and returned today for CPAP therapy three month follow-up.





Sleep Study





- Results


Type of Sleep Study: Polysomnography (DONE ON 9/13/22)


Prior sleep studies: Yes


Year and Where: 2014 Oct [facility]





CPAP Compliance Data





- Data Reviewed with Patient


Average duration of nightly device use: 3 hours 48 minutes


Compliance rate %: 44 (75/90 days used)


Current pressure setting (cmH2O): 11-13.4


Average residual AHI: 6.9


Central apnea: 0.7


Obstructive apnea: 2.7


Average large leak: 12.9 L/min





Subjective


Missed days of use due to: reports: travel


Patient concerns: reports: mask leak noise, dry mouth, nose, throat.  denies: 

aerophagia, mask discomfort, air blowing in eyes, condensation in mask/hose, 

nasal congestion, epistaxis


Observed to snore while using device: No


Current pressure setting perceived as: too high


On therapy, patient: reports: other (not feeling improved sleep/restfulness; 

still napping).  denies: drowsiness while driving


Initial [place] Sleepiness Scale score: 13 (8/31/2022)


Current [place] Sleepiness Scale score: 9 (07/11/23)





Allergies and Home Medications


Known drug allergies: Yes (as listed)


Drug allergies reviewed: Yes


Home medication list reviewed: Yes (no changes)


Allergy and home medication list: 


Allergies





acetaminophen [From Darvocet-N] Adverse Reaction (Verified 07/10/23 09:29)


   Unknown


propoxyphene [From Darvocet-N] Adverse Reaction (Verified 07/10/23 09:29)


   Unknown








Review of Systems


Review of systems same as previous: Yes (no changes)





Physical Exam


Vital signs obtained and entered by: PERI DAWSON MA


Blood Pressure: 128/70 (LEFT ARM)


Cuff size: regular


Heart Rate: 58


O2 Saturation: 95


Height: 5 ft 9 in


Weight: 217 lb 3.2 oz


Body Mass Index: 32.1


BMI Classification: Obese





Impression and Plan





1. Obstructive Sleep Apnea-Hypopnea Syndrome, moderate, with fair treatment 

compliance and fair apnea control with elevated residual AHI. The patient has 

been having lots of mask leaks waking him up when the pressure gets high. He 

will take the mask off. He is getting elevated large leaks and his AHI is still 

elevated at 6.9 with a hypopnea index of 2.3 and obstructive index of 2.4. 

Patient feels the pressure is too high. He states the nasal pillows mask, Manjeet 

II, will make his nose sore if he tightens it. He will keep it loose. He also 

gets some oral dryness. He does not think his mouth is open. The large leaks 

could be contributing. I fitted him to a different mask, Moses Dreamwear nasal

pillows, size small cushion. He felt it was more comfortable and that the hose 

being at the top of head would work good too. He often gets wrapped up in the 

tubing. I gave him the sample mask to take home and try. If he would like to 

continue with this mask he just has to update his DME on the change in style and

order the right mask cushion. He voiced understanding.  Patient's apnea severity

and rationale for treatment to reduce apnea, improve sleep quality and reduce 

cardiovascular and cerebrovascular events was reviewed. I also reviewed the 

benefit of consistent device use of CPAP for hypertension, gastric reflux and 

RLS.





2. Obesity, unspecified. Currently patients BMI is 32.1.  Obesity increases the

risk of apnea, CPAP pressure requirements and overall health risks especially 

cardiovascular and diabetes. Thus patient is advised to lose weight. 





* Continue auto CPAP pressure at 11-13.4 cmH2O


* Fitted with and sent home with sample Dreamwear nasal pillows mask


* Notify me if snoring with mask or feeling that the pressure is too much or too

  little


* Attempt to lose weight


* Call this office if any problems using CPAP


* Return for follow up in 1-2 months, or sooner if concerns arise





Mask provided: Yes


Counseling Topics: Spare mask, Weight loss health impact


Visit Type: In Office


Time Spent with Patient (minutes): 27


Provider Statement: I spent 100% of the Face to Face Visit with the patient with

greater than 50% spent counseling the patient and coordination of care.

## 2023-07-11 NOTE — SLEEP PATIENT INSTRUCTIONS
Sleep Center Visit Summary





- Patient Visit Information


Reason for Visit: 





3 month PAP therapy followup





- Patient Instructions


Additional Instructions: 





You were here for follow up of CPAP therapy.  You will be continued on CPAP 

therapy with pressure at 11-13.4 cmH2O.  





You have been fitted to a Moses Dreamwear nasal pillows mask with a small 

cushion. 





You should follow up with sleep care in 1-2 months. 





You may contact us sooner for any questions or concerns. 








- Clinic Information


Contact: 





[facility]


[address] [place] [address]


[email or web address]


T: [phone]

## 2023-08-17 ENCOUNTER — HOSPITAL ENCOUNTER (OUTPATIENT)
Dept: HOSPITAL 76 - SC | Age: 73
Discharge: HOME | End: 2023-08-17
Attending: NURSE PRACTITIONER
Payer: MEDICARE

## 2023-08-17 VITALS — DIASTOLIC BLOOD PRESSURE: 60 MMHG | OXYGEN SATURATION: 93 % | SYSTOLIC BLOOD PRESSURE: 108 MMHG

## 2023-08-17 DIAGNOSIS — G47.33: Primary | ICD-10-CM

## 2023-08-17 DIAGNOSIS — E66.9: ICD-10-CM

## 2023-08-17 PROCEDURE — 99213 OFFICE O/P EST LOW 20 MIN: CPT

## 2023-08-17 PROCEDURE — 99212 OFFICE O/P EST SF 10 MIN: CPT

## 2023-08-17 NOTE — SLEEP PATIENT INSTRUCTIONS
Sleep Center Visit Summary





- Patient Visit Information


Reason for Visit: 





Five week followup for PAP therapy





- Patient Instructions


Additional Instructions: 





You were here for follow up of CPAP therapy.  You will be continued on CPAP 

therapy with pressure at 11-13.4 cmH2O.  





An adaptor, called V Comm has been ordered to help reduce feel of the pressure 

and enable you to tolerate more pressure. 





You should follow up with sleep care in 1-2 months. 





You may contact us sooner for any questions or concerns. 








- Clinic Information


Contact: 





Located within [facility]


[address] [place] [id number]


[email or web address]


T: [phone]

## 2023-08-17 NOTE — SLEEP CARE CONSULTATION
Information from patient questionnaire entered by Kamryn Harry.





I have reviewed and concur with the information entered by Kamryn Harry. This 

document represents the service I personally performed and the decisions made by

me, Alis Licona ARNP.





History of Present Illness


Service Date and Time: 08/17/2023    1050


Previous diagnosis: Moderate, Obstructive Sleep Apnea-Hypopnea Syndrome


AHI: 29.5 (in 2022)


Reason for follow up: other (5 WEEK F/U)


Accompanied by: Spouse


Equipment type: CPAP (RESMED Airsense 11, s/u 10/07/2022)


Equipment obtained from: Other ([facility]; getting supplies)


Mask style: Nasal pillows


Mask brand: Respironics (Dreamwear)


Backup mask available: Yes (other mask)


Last cushion change: 5 weeks


Prior sleep studies: Yes


Year and Where: 2014 Oct [facility]


Type of Sleep Study: Polysomnography (DONE ON 9/13/22)


HPI additional information: 





MARISELA DUMONT was diagnosed to have moderate, AHI 29.5, obstructive sleep 

apnea-hypopnea syndrome and returned today for CPAP therapy 5 week follow-up.





Sleep Study





- Results


Type of Sleep Study: Polysomnography (DONE ON 9/13/22)


Prior sleep studies: Yes


Year and Where: 2014 Oct [facility]





CPAP Compliance Data





- Data Reviewed with Patient


Average duration of nightly device use: 3 hours 51 minutes


Compliance rate %: 54 (34/37 days used)


Current pressure setting (cmH2O): 11-13.4


Average residual AHI: 7.7


Central apnea: 0.8


Obstructive apnea: 3.2


Hypopnea: 2.2


Average large leak: 9.3 L/min





Subjective


Missed days of use due to: reports: mask issues


Patient concerns: reports: mask discomfort, mask leak noise, dry mouth, nose, 

throat.  denies: aerophagia, air blowing in eyes, condensation in mask/hose, 

nasal congestion, epistaxis


Observed to snore while using device: Yes


Current pressure setting perceived as: comfortable


On therapy, patient: reports: other (still falling asleep in afternoon)


Initial [place] Sleepiness Scale score: 13 (8/31/2022)


Current [place] Sleepiness Scale score: 6 (08/17/23)





Allergies and Home Medications


Known drug allergies: Yes (as listed)


Drug allergies reviewed: Yes


Home medication list reviewed: Yes (no changes)


Allergy and home medication list: 


Allergies





acetaminophen [From Darvocet-N] Adverse Reaction (Verified 08/16/23 09:00)


   Unknown


propoxyphene [From Darvocet-N] Adverse Reaction (Verified 08/16/23 09:00)


   Unknown





Review of Systems


Review of systems same as previous: Yes (no changes)





Physical Exam


Vital signs obtained and entered by: KAMRYN DAWSON MA


Blood Pressure: 108/60 (LEFT ARM)


Cuff size: regular


Heart Rate: 60


O2 Saturation: 93


Height: 5 ft 9 in


Weight: 215 lb 6.4 oz


Body Mass Index: 31.8


BMI Classification: Obese





Impression and Plan





1. Obstructive Sleep Apnea-Hypopnea Syndrome, moderate, with fair treatment 

compliance and fair apnea control with elevated residual AHI. On CPAP therapy, 

he does not feel an improvement and is still taking naps during the day 

according to his wife. His residual AHI continues to be elevated.  He does not 

feel he could tolerate more pressure than what he has but he does state it is 

more comfortable with the new mask.  He would like to get more cushions to go 

with a DreamWear nasal pillows mask.  I am going to order a V com adapter to 

help reduce the feel of the pressure.  I will also add that he has changed the 

mask so that he can initiate getting him new cushions. I will follow-up with him

in 1 to 2 months and if he is still struggling and AHI is elevated I will order 

a titration study to find optimal pressure.  He voiced understanding and 

agreement with this plan of care. Patient's apnea severity and rationale for 

treatment to reduce apnea, improve sleep quality and reduce cardiovascular and 

cerebrovascular events was reviewed. I also reviewed the benefit of consistent 

device use of CPAP for hypertension, gastric reflux and RLS.





2. Obesity, unspecified. Currently patients BMI is 31.8.  Obesity increases the

risk of apnea, CPAP pressure requirements and overall health risks especially 

cardiovascular and diabetes. Thus patient is advised to lose weight. 





* Continue auto CPAP pressure at 11-13.4 cmH2O


* V Com adaptor


* Notify me if snoring with mask or feeling that the pressure is too much or too

  little


* Attempt to lose weight


* Call this office if any problems using CPAP


* Return for follow up in 1-2 months, or sooner if concerns arise


* 





Counseling Topics: Spare mask, Weight loss health impact


Prescriptions: Other (V Com)


Visit Type: In Office


Time Spent with Patient (minutes): 20


Provider Statement: I spent 100% of the Face to Face Visit with the patient with

greater than 50% spent counseling the patient and coordination of care.

## 2023-09-12 ENCOUNTER — HOSPITAL ENCOUNTER (OUTPATIENT)
Dept: HOSPITAL 76 - DI.S | Age: 73
Discharge: HOME | End: 2023-09-12
Attending: PHYSICIAN ASSISTANT
Payer: MEDICARE

## 2023-09-12 DIAGNOSIS — Z77.090: Primary | ICD-10-CM

## 2023-09-12 NOTE — XRAY REPORT
PROCEDURE:  Chest 2 View X-Ray

 

INDICATIONS:  EXPOSURE TO ASBESTOS

 

TECHNIQUE:  2 views of the chest were acquired.  

 

COMPARISON:  6/30/2015.

 

FINDINGS:  

 

Surgical changes and devices:  Cholecystectomy clips.

 

Lungs and pleura:  No pleural effusions or pneumothorax. Bibasilar atelectasis. No calcified pleural 
plaques identified by plain from radiograph. 

 

Mediastinum:  Mediastinal contours appear normal.  Heart size is normal.  

 

Bones and chest wall:  No suspicious bony lesions.  Overlying soft tissues appear unremarkable.  

 

 

IMPRESSION:  

 

No acute cardiopulmonary process.

 

Reviewed by: Cary Wolfe MD, PhD on 9/12/2023 2:25 PM PDT

Approved by: Cary Wolfe MD, PhD on 9/12/2023 2:25 PM PDT

 

 

Station ID:  [id number]

## 2023-10-17 ENCOUNTER — HOSPITAL ENCOUNTER (OUTPATIENT)
Dept: HOSPITAL 76 - SC | Age: 73
Discharge: HOME | End: 2023-10-17
Attending: NURSE PRACTITIONER
Payer: MEDICARE

## 2023-10-17 VITALS — OXYGEN SATURATION: 95 % | SYSTOLIC BLOOD PRESSURE: 113 MMHG | DIASTOLIC BLOOD PRESSURE: 68 MMHG

## 2023-10-17 DIAGNOSIS — E66.9: ICD-10-CM

## 2023-10-17 DIAGNOSIS — G47.33: Primary | ICD-10-CM

## 2023-10-17 PROCEDURE — 99213 OFFICE O/P EST LOW 20 MIN: CPT

## 2023-10-17 PROCEDURE — 99212 OFFICE O/P EST SF 10 MIN: CPT

## 2023-10-17 NOTE — SLEEP PATIENT INSTRUCTIONS
Sleep Center Visit Summary





- Patient Visit Information


Reason for Visit: 





Two month followup





- Patient Instructions


Additional Instructions: 





You were here for follow up of CPAP therapy.  You will be continued on CPAP 

therapy with pressure at 11-13.4 cmH2O.  





You will be completing a titration sleep study in our sleep lab where you will 

be sleeping with the CPAP machine on and we will be adjusting your pressures to 

find your optimal pressure settings. 





Once we have your results back, we will call you and schedule a follow up to go 

over the results.





You will be called by our office staff to schedule your follow up, but you may 

contact us with any questions or issue as needed.





You should follow up with sleep care after titration study. 





You may contact us sooner for any questions or concerns. 








- Clinic Information


Contact: 





[facility]


[address] [place] [address]


[email or web address]


T: [phone]

## 2023-10-17 NOTE — SLEEP CARE CONSULTATION
Information from patient questionnaire entered by Peri Harry.





I have reviewed and concur with the information entered by Peri Harry. This 

document represents the service I personally performed and the decisions made by

me, Alis Licona ARNP.





History of Present Illness


Service Date and Time: 10/17/2023    1100


Previous diagnosis: Moderate, Obstructive Sleep Apnea-Hypopnea Syndrome


AHI: 29.5 (in 2022)


Reason for follow up: other (2 MONTH F/U)


Accompanied by: Spouse


Equipment type: CPAP (RESMED Airsense 11, s/u 10/07/2022)


Equipment obtained from: Other ([facility]; getting supplies)


Mask style: Nasal pillows


Mask brand: Respironics (Dreamwear)


Backup mask available: Yes


Last cushion change: last week


Prior sleep studies: Yes


Year and Where: 2014 Oct [facility]


Type of Sleep Study: Polysomnography (DONE ON 9/13/22)


HPI additional information: 





MARISELA DUMONT was diagnosed to have moderate, AHI 29.5, obstructive sleep apnea-

hypopnea syndrome and returned today for CPAP therapy 2 month follow-up.





Sleep Study





- Results


Type of Sleep Study: Polysomnography (DONE ON 9/13/22)


Prior sleep studies: Yes


Year and Where: 2014 Oct [facility]





CPAP Compliance Data





- Data Reviewed with Patient


Average duration of nightly device use: 3 hours 30 minutes


Compliance rate %: 30 (56/60 days used)


Current pressure setting (cmH2O): 11-13.4 (avg 13.1)


Average residual AHI: 16.3


Central apnea: 2.3


Obstructive apnea: 8.3


Hypopnea: 2.7


Average large leak: 9.6 L/min


Compliance data discussion: 





He did try V-Com but did not feel like there was enough pressure and took it 

off. 





Subjective


Missed days of use due to: reports: mask issues (mask leaking when pressure is 

higher)


Patient concerns: reports: mask leak noise, dry mouth, nose, throat.  denies: 

aerophagia, mask discomfort, air blowing in eyes, condensation in mask/hose, 

nasal congestion, epistaxis


Observed to snore while using device: Yes


Current pressure setting perceived as: comfortable (but will be high in middle 

of night)


On therapy, patient: reports: other (good and bad nights).  denies: drowsiness 

while driving


Initial [place] Sleepiness Scale score: 13 (8/31/2022)


Current [place] Sleepiness Scale score: 7





Allergies and Home Medications


Known drug allergies: Yes (as listed)


Drug allergies reviewed: Yes


Home medication list reviewed: Yes (Eliquis; Tamsulosin)


Allergy and home medication list: 


Allergies





acetaminophen [From Darvocet-N] Adverse Reaction (Verified 10/16/23 09:01)


   Unknown


propoxyphene [From Darvocet-N] Adverse Reaction (Verified 10/16/23 09:01)


   Unknown





Review of Systems


Review of systems same as previous: No (testing for lightheadedness; low BP; 

shortness of breath)





Physical Exam


Vital signs obtained and entered by: Alis SMITH NP


Blood Pressure: 113/68


Cuff size: wrist (left)


Heart Rate: 61


O2 Saturation: 95


Height: 5 ft 9 in


Weight: 220 lb 9.6 oz


Body Mass Index: 32.5


BMI Classification: Obese





Impression and Plan





1. Obstructive Sleep Apnea-Hypopnea Syndrome, moderate, with poor treatment 

compliance and fair apnea control. On CPAP therapy, the patient has better sleep

quality and is more rested overall. He did try the V-Com and stated he felt like

he was not getting enough air with it in place so he stopped using it. I 

explained that this is what that doctor is supposed to do to reduce the feel of 

the pressure so that we could increase his pressure.  He voiced understanding. 

He has had increased problems with lightheadedness and low blood pressure. His 

doctor is trying to confirm diagnosis for possible CHF and atrial fibrillation. 

He is currently on Eliquis. After reviewing his therapy data, it appears his AHI

has increased with increased central and obstructive apneas.  Patient's pressure

setting is still not optimal to control sleep apnea. I advised patient that a 

titration study will be next step to determine a more optimal pressure setting. 

They voiced understanding and agreement. Patient's apnea severity and rationale 

for treatment to reduce apnea, improve sleep quality and reduce cardiovascular 

and cerebrovascular events was reviewed. I also reviewed the benefit of 

consistent device use of CPAP for hypertension, gastric reflux and RLS.





2. Obesity, unspecified. Currently patients BMI is 32.5.  Obesity increases the

risk of apnea, CPAP pressure requirements and overall health risks especially 

cardiovascular and diabetes. Thus patient is advised to lose weight.





* Continue auto CPAP pressure at 11-13.4 cmH2O


* Titration study


* Notify me if snoring with mask or feeling that the pressure is too much or too

  little


* Attempt to lose weight


* Call this office if any problems using CPAP


* Return for follow up after titration study, or sooner if concerns arise





Counseling Topics: Weight loss health impact


Plan: 


Titration study and followup


Visit Type: In Office


Time Spent with Patient (minutes): 29


Provider Statement: I spent 100% of the Face to Face Visit with the patient with

greater than 50% spent counseling the patient and coordination of care.

## 2023-11-12 ENCOUNTER — HOSPITAL ENCOUNTER (OUTPATIENT)
Dept: HOSPITAL 76 - SC | Age: 73
Discharge: HOME | End: 2023-11-12
Attending: NURSE PRACTITIONER
Payer: MEDICARE

## 2023-11-12 DIAGNOSIS — I10: ICD-10-CM

## 2023-11-12 DIAGNOSIS — I48.91: ICD-10-CM

## 2023-11-12 DIAGNOSIS — G47.33: Primary | ICD-10-CM

## 2023-11-12 PROCEDURE — 95811 POLYSOM 6/>YRS CPAP 4/> PARM: CPT

## 2023-12-01 ENCOUNTER — HOSPITAL ENCOUNTER (OUTPATIENT)
Dept: HOSPITAL 76 - SC | Age: 73
Discharge: HOME | End: 2023-12-01
Attending: NURSE PRACTITIONER
Payer: MEDICARE

## 2023-12-01 VITALS — SYSTOLIC BLOOD PRESSURE: 128 MMHG | OXYGEN SATURATION: 96 % | DIASTOLIC BLOOD PRESSURE: 68 MMHG

## 2023-12-01 DIAGNOSIS — Z79.01: ICD-10-CM

## 2023-12-01 DIAGNOSIS — I48.91: ICD-10-CM

## 2023-12-01 DIAGNOSIS — G47.33: Primary | ICD-10-CM

## 2023-12-01 DIAGNOSIS — E66.9: ICD-10-CM

## 2023-12-01 PROCEDURE — 99213 OFFICE O/P EST LOW 20 MIN: CPT

## 2023-12-01 PROCEDURE — 99212 OFFICE O/P EST SF 10 MIN: CPT

## 2023-12-01 NOTE — SLEEP PATIENT INSTRUCTIONS
Sleep Center Visit Summary





- Patient Visit Information


Reason for Visit: 





Titration study follow-up





- Patient Instructions


Instructions Attached:  BiPap About, BiPap Using


Additional Instructions: 





You were here for follow up of BIPAP therapy.  You will be continued on BiPAP 

therapy. Please let us know if the pressure change is uncomfortable and we can 

make further adjustments of the pressure. 





You should follow up with sleep care one month after obtaining new machine. 

Please call to make a followup compliance visit once you have your new machine. 





You may contact us sooner for any questions or concerns. 








- Clinic Information


Contact: 





[facility]


[address] [place] [address]


[email or web address]


T: [phone]

## 2023-12-01 NOTE — SLEEP CARE CONSULTATION
Information from patient questionnaire entered by Peri Harry.





I have reviewed and concur with the information entered by Peri Harry. This 

document represents the service I personally performed and the decisions made by

me, Alis Licona ARNP.





History of Present Illness


Service Date and Time: 12/01/2023    1316


Accompanied by: Spouse


Initial Redby Sleepiness Scale score: 13 (8/31/2022)


Current Redby Sleepiness Scale score: 4 (12/01/23)


Additional HPI information: 





MARISELA DUMONT returns with spouse for follow up of the sleep study with a manual

BIPAP titration study performed on 11/12/2023. 





The patient was informed of the following polysomnography findings:





CPAP was initiated at 5 cmH2O and titrated up to BiPAP ASV. BiPAP ASV appeared 

to be optimal (AHI of 2.2  4.5 per hour on the mode). There was supine REM 

sleep on the pressure. Oxygen saturation was mildly low. CPAP allowed frequent 

central apneas and Cheyne-Andrade respiration.


The patient appeared to have tolerated positive airway pressure therapy well. 





I explained that the optimal pressure is BiPAP ASV with setting: EPAP at 6 and 

pressure support 3  15 cmH2O with automatic backup rate.





Sleep Study





- Results


Type of Sleep Study: Polysomnography (DONE ON 9/13/22 TITRATION 11/12/23)


Prior sleep studies: Yes


Year and Where: 2014 Oct [facility]


Polysomnography/Home Sleep Study results: 





IMPRESSION: The quality of the study is good. CPAP was initiated at 5 cmH2O and 

titrated up to BiPAP ASV.


BiPAP ASV appeared to be optimal (AHI of 2.2  4.5 per hour on the mode). There 

was supine REM sleep on the


pressure. Oxygen saturation was mildly low. CPAP allowed frequent central apneas

and Cheyne-Andrade respiration.


The patient appeared to have tolerated positive airway pressure therapy well. 

The patients sleep efficiency was


slightly reduced due to sleep onset insomnia. The sleep architecture was normal.

There was no periodic leg


movement of sleep. Cardiac rhythm was atrial fibrillation. No abnormal behavior 

(parasomnia) observed


during the night.





Allergies and Home Medications


Known drug allergies: Yes (as listed)


Drug allergies reviewed: Yes


Home medication list reviewed: Yes (no changes)


Allergy and home medication list: 


Allergies





acetaminophen [From Darvocet-N] Adverse Reaction (Verified 11/30/23 14:09)


   Unknown


propoxyphene [From Darvocet-N] Adverse Reaction (Verified 11/30/23 14:09)


   Unknown





Review of Systems


Review of systems same as previous: Yes (NO CHANGE)





Physical Exam


Vital signs obtained and entered by: PERI DAWSON MA


Blood Pressure: 128/68 (LEFT ARM)


Cuff size: regular


Heart Rate: 62


O2 Saturation: 96


Height: 5 ft 9 in


Weight: 224 lb 9.6 oz


Body Mass Index: 33.1


BMI Classification: Obese





Impression and Plan





1. Obstructive Sleep Apnea-Hypopnea Syndrome, moderate, with Cheyne-Andrade 

respirations. He returns for results of titration study which shows optimal 

pressure setting with BiPAP ASV set at EPAP at 6 cmH2O and pressure support 3  

15 cmH2O with automatic backup rate. I will order the new BiPAP and have patient

followup for compliance visit. He is now using the nasal cushion fitted to him 

at the sleep study and finds it comfortable. Patient's apnea severity and 

rationale for treatment to reduce apnea, improve sleep quality and reduce 

cardiovascular and cerebrovascular events was reviewed. I also reviewed the 

benefit of consistent device use of BiPAP for hypertension, arrhythmia (possible

Afib), possible CHF and gastric reflux.





2. Atrial fibrillation. Cardiac monitoring showed atrial fibrillation during 

night of sleep study. Patient is already being evaluated for this arrhythmia and

is on Eliquis. 





3. Obesity, unspecified. Currently patients BMI is 33.1.  Obesity increases the

risk of apnea, CPAP pressure requirements and overall health risks especially 

cardiovascular and diabetes. Thus patient is advised to lose weight. 





* Change to BiPAP ASV with EPAP at 6 cmH2O and pressure support 3-15 cmH2O with 

  automatic backup rate


* Notify me if snoring with mask or feeling that the pressure is too much or too

  little


* Attempt to lose weight


* Call this office if any problems using CPAP


* Return for follow up one month after obtaining new device, or sooner if 

  concerns arise





Counseling Topics: Weight loss health impact


Prescriptions: BiPAP


Visit Type: In Office


Time Spent with Patient (minutes): 25


Provider Statement: I spent 100% of the Face to Face Visit with the patient with

greater than 50% spent counseling the patient and coordination of care.

## 2024-02-01 ENCOUNTER — HOSPITAL ENCOUNTER (OUTPATIENT)
Dept: HOSPITAL 76 - LAB.S | Age: 74
Discharge: HOME | End: 2024-02-01
Attending: INTERNAL MEDICINE
Payer: MEDICARE

## 2024-02-01 DIAGNOSIS — I44.7: ICD-10-CM

## 2024-02-01 DIAGNOSIS — E78.5: ICD-10-CM

## 2024-02-01 DIAGNOSIS — D68.69: ICD-10-CM

## 2024-02-01 DIAGNOSIS — I48.19: ICD-10-CM

## 2024-02-01 DIAGNOSIS — I10: Primary | ICD-10-CM

## 2024-02-01 LAB
ANION GAP SERPL CALCULATED.4IONS-SCNC: 6 MMOL/L (ref 6–13)
BASOPHILS NFR BLD AUTO: 0 10^3/UL (ref 0–0.1)
BASOPHILS NFR BLD AUTO: 0.4 %
BUN SERPL-MCNC: 23 MG/DL (ref 6–20)
CALCIUM UR-MCNC: 9.7 MG/DL (ref 8.5–10.3)
CHLORIDE SERPL-SCNC: 101 MMOL/L (ref 101–111)
CO2 SERPL-SCNC: 29 MMOL/L (ref 21–32)
CREAT SERPLBLD-SCNC: 1 MG/DL (ref 0.6–1.3)
EOSINOPHIL # BLD AUTO: 0.1 10^3/UL (ref 0–0.7)
EOSINOPHIL NFR BLD AUTO: 2.6 %
ERYTHROCYTE [DISTWIDTH] IN BLOOD BY AUTOMATED COUNT: 13.3 % (ref 12–15)
GFRSERPLBLD MDRD-ARVRAT: 73 ML/MIN/{1.73_M2} (ref 89–?)
GLUCOSE SERPL-MCNC: 114 MG/DL (ref 74–104)
HCT VFR BLD AUTO: 42.2 % (ref 42–52)
HGB UR QL STRIP: 14 G/DL (ref 14–18)
LYMPHOCYTES # SPEC AUTO: 1.7 10^3/UL (ref 1.5–3.5)
LYMPHOCYTES NFR BLD AUTO: 31 %
MCH RBC QN AUTO: 33.6 PG (ref 27–31)
MCHC RBC AUTO-ENTMCNC: 33.2 G/DL (ref 32–36)
MCV RBC AUTO: 101.2 FL (ref 80–94)
MONOCYTES # BLD AUTO: 0.5 10^3/UL (ref 0–1)
MONOCYTES NFR BLD AUTO: 9.2 %
NEUTROPHILS # BLD AUTO: 3.1 10^3/UL (ref 1.5–6.6)
NEUTROPHILS # SNV AUTO: 5.5 X10^3/UL (ref 4.8–10.8)
NEUTROPHILS NFR BLD AUTO: 56.6 %
NRBC # BLD AUTO: 0 /100WBC
NRBC # BLD AUTO: 0 X10^3/UL
PDW BLD AUTO: 9.5 FL (ref 7.4–11.4)
PLATELET # BLD: 252 10^3/UL (ref 130–450)
POTASSIUM SERPL-SCNC: 4 MMOL/L (ref 3.5–4.5)
RBC MAR: 4.17 10^6/UL (ref 4.7–6.1)
SODIUM SERPLBLD-SCNC: 136 MMOL/L (ref 135–145)

## 2024-02-01 PROCEDURE — 85025 COMPLETE CBC W/AUTO DIFF WBC: CPT

## 2024-02-01 PROCEDURE — 36415 COLL VENOUS BLD VENIPUNCTURE: CPT

## 2024-02-01 PROCEDURE — 80048 BASIC METABOLIC PNL TOTAL CA: CPT

## 2024-07-10 ENCOUNTER — HOSPITAL ENCOUNTER (OUTPATIENT)
Dept: HOSPITAL 76 - SC | Age: 74
Discharge: HOME | End: 2024-07-10
Attending: NURSE PRACTITIONER
Payer: MEDICARE

## 2024-07-10 VITALS — DIASTOLIC BLOOD PRESSURE: 68 MMHG | SYSTOLIC BLOOD PRESSURE: 136 MMHG | OXYGEN SATURATION: 97 %

## 2024-07-10 DIAGNOSIS — E66.9: ICD-10-CM

## 2024-07-10 DIAGNOSIS — G47.33: Primary | ICD-10-CM

## 2024-07-10 PROCEDURE — 99212 OFFICE O/P EST SF 10 MIN: CPT

## 2024-07-10 PROCEDURE — 99213 OFFICE O/P EST LOW 20 MIN: CPT

## 2024-07-10 NOTE — SLEEP PATIENT INSTRUCTIONS
Sleep Center Visit Summary





- Patient Visit Information


Reason for Visit: 





First compliance follow-up BiPAP ASV therapy





- Patient Instructions


Additional Instructions: 





You were here for follow up of BIPAP ASV therapy.  You will be continued on 

BiPAP ASV therapy with pressure at 6 cmH2O with 3-15 cmH2O pressure support.  








You should follow up with sleep care in 12 months. 





You may contact us sooner for any questions or concerns. 








- Clinic Information


Contact: 





[facility]


[address] [place] [address]


[email or web address]


T: [phone]

## 2024-07-10 NOTE — SLEEP CARE CONSULTATION
Information from patient questionnaire entered by Kamryn Harry.





I have reviewed and concur with the information entered by Kamryn Harry. This 

document represents the service I personally performed and the decisions made by

me, Alis Licona ARNP.





History of Present Illness


Service Date and Time: 07/10/2024    0934


Previous diagnosis: Moderate, Obstructive Sleep Apnea-Hypopnea Syndrome


AHI: 29.5 (in 2022)


Reason for follow up: first compliance (NEED MACHINE)


Equipment type: ASV (ResMed)


Equipment obtained from: Other ([facility]; getting supplies)


Mask style: Nasal


Backup mask available: Yes (other types but not one he is using)


Last cushion change: last night


Prior sleep studies: Yes


Year and Where: 2014 Oct [facility]


Type of Sleep Study: Polysomnography (DONE ON 9/13/22 TITRATION 11/12/23)


HPI additional information: 





MARISELA DUMONT was diagnosed to have moderate, AHI 29.5, obstructive sleep apnea-

hypopnea syndrome and returned today for BiPAP therapy first compliance follow-

up.





Sleep Study





- Results


Type of Sleep Study: Polysomnography (DONE ON 9/13/22 TITRATION 11/12/23)


Prior sleep studies: Yes


Year and Where: 2014 Oct [facility]





CPAP Compliance Data





- Data Reviewed with Patient


Average duration of nightly device use: 4 HRS 35 MINS


Compliance rate %: 73 (01/25/24-02/23/24; 30/30 days used; 80% in last 30 days)


Current pressure setting (cmH2O): 6/3-15


Average residual AHI: 2.2


Obstructive apnea: 0.7


Hypopnea: 1.5


Average large leak: 3 L/min





Subjective


Patient concerns: denies: aerophagia, mask discomfort, air blowing in eyes, mask

leak noise, condensation in mask/hose, nasal congestion, dry mouth, nose, throat

, epistaxis


Observed to snore while using device: No


Current pressure setting perceived as: comfortable


On therapy, patient: reports: sleeping better, awakening more refreshed, being 

more awake and alert during the day, more rested overall.  denies: drowsiness 

while driving


Initial [place] Sleepiness Scale score: 13 (8/31/2022)


Current [place] Sleepiness Scale score: 10 (07/10/24)





Allergies and Home Medications


Known drug allergies: Yes (as listed)


Drug allergies reviewed: Yes


Home medication list reviewed: Yes (Eliquis)


Allergy and home medication list: 


Allergies





acetaminophen [From Darvocet-N] Adverse Reaction (Verified 07/08/24 10:42)


   Unknown


propoxyphene [From Darvocet-N] Adverse Reaction (Verified 07/08/24 10:42)


   Unknown





Review of Systems


Review of systems same as previous: No (Heart Rhythm, cardioversion x2)





Physical Exam


Vital signs obtained and entered by: KAMRYN DAWSON MA


Blood Pressure: 136/68 (LEFT ARM)


Cuff size: regular


Heart Rate: 60


O2 Saturation: 97


Height: 5 ft 9 in


Weight: 217 lb 12.8 oz


Body Mass Index: 32.1


BMI Classification: Obese





Impression and Plan





1. Obstructive Sleep Apnea-Hypopnea Syndrome, moderate, with good treatment 

compliance and good apnea control. On BiPAP therapy, the patient has better 

sleep quality and is more rested overall. He changed to a nasal cushion mask and

says it made all the difference. He is now sleeping better because there are 

less interruptions of his sleep. Although, he still only sleeps about 4.5 hours 

a night.  He is satisfied with current BiPAP ASV therapy and has significant 

improvement of his sleep apnea. Patient's apnea severity and rationale for 

treatment to reduce apnea, improve sleep quality and reduce cardiovascular and 

cerebrovascular events was reviewed. I also reviewed the benefit of consistent 

device use of BiPAP for hypertension, possible cardiac disease (CHF), 

arrhythmia, gastric reflux.





2. Obesity, unspecified. Currently patients BMI is 32.1.  Obesity increases the

risk of apnea, BiPAP pressure requirements and overall health risks especially 

cardiovascular and diabetes. Thus patient is advised to lose weight. 





* Continue BiPAP ASV pressure at EPAP 6 cmH2O with 3-15 cmH2O pressure support


* Notify me if snoring with mask or feeling that the pressure is too much or too

  little


* Attempt to lose weight


* Call this office if any problems using BiPAP


* Return for follow up in 12 months, or sooner if concerns arise





Counseling Topics: Spare mask, Weight loss health impact


Follow up with Sleep Care in: 1 year


Visit Type: In Office


Time Spent with Patient (minutes): 20


Provider Statement: I spent 100% of the Face to Face Visit with the patient with

greater than 50% spent counseling the patient and coordination of care.

## 2024-08-19 ENCOUNTER — HOSPITAL ENCOUNTER (OUTPATIENT)
Dept: HOSPITAL 76 - LAB.S | Age: 74
Discharge: HOME | End: 2024-08-19
Attending: INTERNAL MEDICINE
Payer: MEDICARE

## 2024-08-19 DIAGNOSIS — I48.19: Primary | ICD-10-CM

## 2024-08-19 LAB
ANION GAP SERPL CALCULATED.4IONS-SCNC: 6 MMOL/L (ref 6–13)
BASOPHILS NFR BLD AUTO: 0 10^3/UL (ref 0–0.1)
BASOPHILS NFR BLD AUTO: 0.2 %
BUN SERPL-MCNC: 19 MG/DL (ref 6–20)
CALCIUM UR-MCNC: 9.2 MG/DL (ref 8.5–10.3)
CHLORIDE SERPL-SCNC: 106 MMOL/L (ref 101–111)
CO2 SERPL-SCNC: 26 MMOL/L (ref 21–32)
CREAT SERPLBLD-SCNC: 1.1 MG/DL (ref 0.6–1.3)
EOSINOPHIL # BLD AUTO: 0.1 10^3/UL (ref 0–0.7)
EOSINOPHIL NFR BLD AUTO: 1.2 %
ERYTHROCYTE [DISTWIDTH] IN BLOOD BY AUTOMATED COUNT: 12.8 % (ref 12–15)
GFRSERPLBLD MDRD-ARVRAT: 65 ML/MIN/{1.73_M2} (ref 89–?)
GLUCOSE SERPL-MCNC: 132 MG/DL (ref 74–104)
HCT VFR BLD AUTO: 38.8 % (ref 42–52)
HGB UR QL STRIP: 13 G/DL (ref 14–18)
LYMPHOCYTES # SPEC AUTO: 1.3 10^3/UL (ref 1.5–3.5)
LYMPHOCYTES NFR BLD AUTO: 22.2 %
MCH RBC QN AUTO: 34.3 PG (ref 27–31)
MCHC RBC AUTO-ENTMCNC: 33.5 G/DL (ref 32–36)
MCV RBC AUTO: 102.4 FL (ref 80–94)
MONOCYTES # BLD AUTO: 0.4 10^3/UL (ref 0–1)
MONOCYTES NFR BLD AUTO: 7.6 %
NEUTROPHILS # BLD AUTO: 4 10^3/UL (ref 1.5–6.6)
NEUTROPHILS # SNV AUTO: 5.8 X10^3/UL (ref 4.8–10.8)
NEUTROPHILS NFR BLD AUTO: 68.6 %
NRBC # BLD AUTO: 0 /100WBC
NRBC # BLD AUTO: 0 X10^3/UL
PDW BLD AUTO: 9.5 FL (ref 7.4–11.4)
PLATELET # BLD: 231 10^3/UL (ref 130–450)
POTASSIUM SERPL-SCNC: 4 MMOL/L (ref 3.5–4.5)
RBC MAR: 3.79 10^6/UL (ref 4.7–6.1)
SODIUM SERPLBLD-SCNC: 138 MMOL/L (ref 135–145)

## 2024-08-19 PROCEDURE — 85730 THROMBOPLASTIN TIME PARTIAL: CPT

## 2024-08-19 PROCEDURE — 85025 COMPLETE CBC W/AUTO DIFF WBC: CPT

## 2024-08-19 PROCEDURE — 80048 BASIC METABOLIC PNL TOTAL CA: CPT

## 2024-08-19 PROCEDURE — 36415 COLL VENOUS BLD VENIPUNCTURE: CPT
